# Patient Record
(demographics unavailable — no encounter records)

---

## 2025-02-13 NOTE — PHYSICAL EXAM
[No Acute Distress] : no acute distress [Normal Appearance] : normal appearance [Normal Rate/Rhythm] : normal rate/rhythm [Normal S1, S2] : normal s1, s2 [No Murmurs] : no murmurs [No Resp Distress] : no resp distress [Clear to Auscultation Bilaterally] : clear to auscultation bilaterally [Benign] : benign [Normal Gait] : normal gait [No Focal Deficits] : no focal deficits [Normal Affect] : normal affect

## 2025-02-14 NOTE — ASSESSMENT
[FreeTextEntry1] : 51 year old female current 32 pack year smoker coming in for a follow up on a previous abnormal CT scan.   Data Reviewed Lung cancer LDCT 7/26/2024: groundglass opacity in the apex of the right hemithorax. Read as indeterminate, possibly neoplastic versus infection versus scarring. No other nodules noted.  CT Chest: 8/30/2024 Stable asymmetric pleural parenchymal scarring which has a groundglass component involving the apical segment of the right upper lobe measuring 22 x 19 mm.  9/17/2024: PET scan: No FDG hypermetabolism within right lateral upper lung ground glass opacity. No focal abnormal FDG activity elsewhere. Paraseptal and mild centrilobular edema.  Emphysema Abnormal CT of lung Current Smoker   CT imaging reviewed an donis per Lung RAD 1.1 gudielines GGO nodule less 30 mm is low probability of malignancy. PET is negative making this even less likely. Will plan to return to yearly screening. Patient with no symptoms for COPD so will hold off on testing even with emphysema.  - PET CT negative and GGO <30 mm - Yearly lung cancer screening CT ordered

## 2025-02-14 NOTE — HISTORY OF PRESENT ILLNESS
[Current] : current [>= 20 pack years] : >= 20 pack years [TextBox_4] : 51 year old female current 32 pack year smoker coming in for a follow up on a previous abnormal CT scan.  Patient had lung cancer screening CT which found GGO in the RUL presenting for follow up. States that she has no family hx of lung cancer and is still smoking. Denies shortness of breath or weight loss. No new symptoms. [TextBox_11] : 1 [TextBox_13] : 32 [ESS] : 0

## 2025-02-14 NOTE — REVIEW OF SYSTEMS
[Recent Wt Gain (___ Lbs)] : ~T recent [unfilled] lb weight gain [Fever] : no fever [Fatigue] : no fatigue [Chills] : no chills [Poor Appetite] : no poor appetite [Dry Eyes] : no dry eyes [Cough] : no cough [Hemoptysis] : no hemoptysis [Sputum] : no sputum [Dyspnea] : no dyspnea [Wheezing] : no wheezing [SOB on Exertion] : no sob on exertion [Chest Discomfort] : no chest discomfort [Syncope] : no syncope [Nasal Discharge] : no nasal discharge [GERD] : no gerd [Nocturia] : no nocturia [Arthralgias] : no arthralgias [Headache] : no headache

## 2025-07-08 NOTE — ASSESSMENT
[Discussed Risks and Advised to Quit Smoking] : Discussed risks and advised to quit smoking [Not Ready] : Patient is not ready for cessation intervention [Contemplation] : Contemplation: The patient is considering quitting smoking [de-identified] : Acknowledged how difficult it was to quit smoking. Advised that quitting smoking is the most important thing a person can do for their health. Reports confidence in creating a smoking cessation plan as she has quit in the past. She agrees to call writer at any point in the future he wishes to discuss smoking cessation programs with [facility].

## 2025-07-08 NOTE — HISTORY OF PRESENT ILLNESS
[Current] : Current [TextBox_13] : VANESSA MAYES had telephonic visit for a review of eligibility and discussion of the Low dose CT lung cancer screening program. A telephonic visit occurred due to the patient not having access, or declines to use, a smart phone or a computer for an audio/visual visit. The following was reviewed to determine if patient meets eligibility criteria. -Age 51 year Smoking Status: -Current smoker Started smoking at  18  years old. -Number of pack(s) per day: smokes 15 cigarettes per day -Number of years smoked: 33 -Number of pack years smokin  Ms. MAYES denies any signs or symptoms of lung cancer including new cough, changing cough, hemoptysis, and unintentional weight loss.   Ms. MAYES HX COPD. Has had abnormal outside lung cancer screening last year and there was a follow up PET. She now returns for annual scan. She denies HX of  heart disease, connective tissue disease, and any personal history of cancer. Reports lung cancer in a 1st degree relative: Mother. Reports no lung cancer in a 2nd degree relative. Denies any history of occupational exposures.     [PacksperYear] : 25

## 2025-07-08 NOTE — ASSESSMENT
[Discussed Risks and Advised to Quit Smoking] : Discussed risks and advised to quit smoking [Not Ready] : Patient is not ready for cessation intervention [Contemplation] : Contemplation: The patient is considering quitting smoking [de-identified] : Acknowledged how difficult it was to quit smoking. Advised that quitting smoking is the most important thing a person can do for their health. Reports confidence in creating a smoking cessation plan as she has quit in the past. She agrees to call writer at any point in the future he wishes to discuss smoking cessation programs with [facility].

## 2025-07-08 NOTE — PLAN
[Smoking Cessation Guidance Provided] : Smoking cessation guidance was provided to patient [Other: ___] : referred to [unfilled] [Smoking Cessation] : smoking cessation [FreeTextEntry1] : Plan: Pt meets eligibility criteria for lung cancer screening.    -Low dose CT chest for lung cancer screening. MAGDALENA LOPEZ ordered the low dose CT.       -Follow up with  her ordering  provider after her LDCT results have been reviewed by the multidisciplinary clinical team, if needed.    -Encouraged smoking cessation.    Pt referred to [facility] Smoking Cessation Program [phone]    Should I screen? tool utilized. 6 Year risk of lung cancer is   1.8%.    Patient wishes to proceed with screening.   Engaged in discussion regarding risks of screening during Covid-19 pandemic and precautions that are being used  to reduce exposure.   Engaged in shared decision making with Ms. MAYES . Answered all questions. She verbalized understanding and agreement. She knows to call back with and questions or concerns.

## 2025-07-08 NOTE — PLAN
[Smoking Cessation Guidance Provided] : Smoking cessation guidance was provided to patient [Other: ___] : referred to [unfilled] [Smoking Cessation] : smoking cessation [FreeTextEntry1] : Plan: Pt meets eligibility criteria for lung cancer screening.    -Low dose CT chest for lung cancer screening. MAGDALENA LOPEZ ordered the low dose CT.       -Follow up with  her ordering  provider after her LDCT results have been reviewed by the multidisciplinary clinical team, if needed.    -Encouraged smoking cessation.    Pt referred to [facility] Smoking Cessation Program [phone]    Should I screen? tool utilized. 6 Year risk of lung cancer is   1.8%.    Patient wishes to proceed with screening.   Engaged in discussion regarding risks of screening during Covid-19 pandemic and precautions that are being used  to reduce exposure.   Engaged in shared decision making with Ms. MAYES . Answered all questions. She verbalized understanding and agreement. She knows to call back with and questions or concerns.

## 2025-07-21 NOTE — HISTORY OF PRESENT ILLNESS
[Current] : current [>= 20 pack years] : >= 20 pack years [TextBox_4] : 51 year old female current 32 pack year smoker coming in for a follow up on a previous abnormal CT scan.  Patient had lung cancer screening CT which found GGO in the RUL presenting for follow up. States that she has no family hx of lung cancer and is still smoking. Denies shortness of breath or weight loss. No new symptoms.  07/18/2025 Pt. presenting for follow up on RUL ground glass opacity on 8/30/24 CT scan. Pt. denies shortness of breath, chest tightness, cough. Pt. is currently smoking after stopping in July 2024 and resuming back in November of 2024. Pt. smoking 10 cigarettes a day. Pt. also vaped for 1 year prior to CT scan last year. Pt. is scheduled for CT of chest on September 3rd. Denies any new pulmonary symptoms since last visit. [TextBox_11] : 1 [TextBox_13] : 32 [ESS] : 0

## 2025-07-21 NOTE — ASSESSMENT
[FreeTextEntry1] : 51 year old female current 32 pack year smoker coming in for a follow up on a previous abnormal CT scan.   Data Reviewed Lung cancer LDCT 7/26/2024: groundglass opacity in the apex of the right hemithorax. Read as indeterminate, possibly neoplastic versus infection versus scarring. No other nodules noted.  CT Chest: 8/30/2024 Stable asymmetric pleural parenchymal scarring which has a groundglass component involving the apical segment of the right upper lobe measuring 22 x 19 mm.  9/17/2024: PET scan: No FDG hypermetabolism within right lateral upper lung ground glass opacity. No focal abnormal FDG activity elsewhere. Paraseptal and mild centrilobular edema.  Emphysema Abnormal CT of lung Current Smoker   CT imaging reviewed as per Lung RAD 1.1 gudielines GGO nodule less 30 mm is low probability of malignancy. PET is negative making this even less likely. Will plan to repeat yearly CT chest which patient is to have performed end of July. Advised about need for smoking cessation.  - PET CT negative and GGO <30 mm - Yearly lung cancer screening CT ordered, planned for end of July 2025